# Patient Record
Sex: MALE | Race: ASIAN | Employment: STUDENT | URBAN - METROPOLITAN AREA
[De-identification: names, ages, dates, MRNs, and addresses within clinical notes are randomized per-mention and may not be internally consistent; named-entity substitution may affect disease eponyms.]

---
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
## 2024-03-03 ENCOUNTER — APPOINTMENT (OUTPATIENT)
Dept: CT IMAGING | Facility: HOSPITAL | Age: 19
End: 2024-03-03
Attending: PEDIATRICS
Payer: COMMERCIAL

## 2024-03-03 ENCOUNTER — HOSPITAL ENCOUNTER (EMERGENCY)
Facility: HOSPITAL | Age: 19
Discharge: HOME | End: 2024-03-03
Attending: PEDIATRICS
Payer: COMMERCIAL

## 2024-03-03 VITALS
TEMPERATURE: 99 F | HEART RATE: 94 BPM | SYSTOLIC BLOOD PRESSURE: 138 MMHG | DIASTOLIC BLOOD PRESSURE: 82 MMHG | RESPIRATION RATE: 24 BRPM | OXYGEN SATURATION: 96 %

## 2024-03-03 DIAGNOSIS — D72.819 LEUKOPENIA, UNSPECIFIED TYPE: ICD-10-CM

## 2024-03-03 DIAGNOSIS — D69.6 THROMBOCYTOPENIA (HCC): ICD-10-CM

## 2024-03-03 DIAGNOSIS — R51.9 SINUS HEADACHE: Primary | ICD-10-CM

## 2024-03-03 LAB
ALBUMIN SERPL-MCNC: 4 G/DL (ref 3.4–5)
ALBUMIN/GLOB SERPL: 1 {RATIO} (ref 1–2)
ALP LIVER SERPL-CCNC: 81 U/L
ALT SERPL-CCNC: 53 U/L
ANION GAP SERPL CALC-SCNC: 3 MMOL/L (ref 0–18)
AST SERPL-CCNC: 56 U/L (ref 15–37)
BASOPHILS # BLD AUTO: 0.01 X10(3) UL (ref 0–0.2)
BASOPHILS NFR BLD AUTO: 0.3 %
BILIRUB SERPL-MCNC: 1.1 MG/DL (ref 0.1–2)
BUN BLD-MCNC: 14 MG/DL (ref 9–23)
CALCIUM BLD-MCNC: 9.1 MG/DL (ref 8.5–10.1)
CHLORIDE SERPL-SCNC: 106 MMOL/L (ref 98–112)
CO2 SERPL-SCNC: 27 MMOL/L (ref 21–32)
CREAT BLD-MCNC: 0.92 MG/DL
EGFRCR SERPLBLD CKD-EPI 2021: 122 ML/MIN/1.73M2 (ref 60–?)
EOSINOPHIL # BLD AUTO: 0.03 X10(3) UL (ref 0–0.7)
EOSINOPHIL NFR BLD AUTO: 0.9 %
ERYTHROCYTE [DISTWIDTH] IN BLOOD BY AUTOMATED COUNT: 12.6 %
GLOBULIN PLAS-MCNC: 3.9 G/DL (ref 2.8–4.4)
GLUCOSE BLD-MCNC: 107 MG/DL (ref 70–99)
HCT VFR BLD AUTO: 42.8 %
HGB BLD-MCNC: 14.7 G/DL
IMM GRANULOCYTES # BLD AUTO: 0.01 X10(3) UL (ref 0–1)
IMM GRANULOCYTES NFR BLD: 0.3 %
LYMPHOCYTES # BLD AUTO: 0.49 X10(3) UL (ref 1–4)
LYMPHOCYTES NFR BLD AUTO: 15.4 %
MCH RBC QN AUTO: 28.5 PG (ref 26–34)
MCHC RBC AUTO-ENTMCNC: 34.3 G/DL (ref 31–37)
MCV RBC AUTO: 83.1 FL
MONOCYTES # BLD AUTO: 0.46 X10(3) UL (ref 0.1–1)
MONOCYTES NFR BLD AUTO: 14.4 %
NEUTROPHILS # BLD AUTO: 2.19 X10 (3) UL (ref 1.5–7.7)
NEUTROPHILS # BLD AUTO: 2.19 X10(3) UL (ref 1.5–7.7)
NEUTROPHILS NFR BLD AUTO: 68.7 %
OSMOLALITY SERPL CALC.SUM OF ELEC: 283 MOSM/KG (ref 275–295)
PLATELET # BLD AUTO: 124 10(3)UL (ref 150–450)
POTASSIUM SERPL-SCNC: 4.1 MMOL/L (ref 3.5–5.1)
PROT SERPL-MCNC: 7.9 G/DL (ref 6.4–8.2)
RBC # BLD AUTO: 5.15 X10(6)UL
SODIUM SERPL-SCNC: 136 MMOL/L (ref 136–145)
WBC # BLD AUTO: 3.2 X10(3) UL (ref 4–11)

## 2024-03-03 PROCEDURE — 99284 EMERGENCY DEPT VISIT MOD MDM: CPT

## 2024-03-03 PROCEDURE — 99285 EMERGENCY DEPT VISIT HI MDM: CPT

## 2024-03-03 PROCEDURE — 85025 COMPLETE CBC W/AUTO DIFF WBC: CPT | Performed by: PEDIATRICS

## 2024-03-03 PROCEDURE — 96361 HYDRATE IV INFUSION ADD-ON: CPT

## 2024-03-03 PROCEDURE — 96374 THER/PROPH/DIAG INJ IV PUSH: CPT

## 2024-03-03 PROCEDURE — 80053 COMPREHEN METABOLIC PANEL: CPT | Performed by: PEDIATRICS

## 2024-03-03 PROCEDURE — 70450 CT HEAD/BRAIN W/O DYE: CPT | Performed by: PEDIATRICS

## 2024-03-03 RX ORDER — DESVENLAFAXINE SUCCINATE 50 MG/1
50 TABLET, EXTENDED RELEASE ORAL DAILY
COMMUNITY

## 2024-03-03 RX ORDER — KETOROLAC TROMETHAMINE 30 MG/ML
30 INJECTION, SOLUTION INTRAMUSCULAR; INTRAVENOUS ONCE
Status: COMPLETED | OUTPATIENT
Start: 2024-03-03 | End: 2024-03-03

## 2024-03-04 NOTE — ED INITIAL ASSESSMENT (HPI)
Pt to the emergency room for diffuse headache, pt seen at  prior to arrival. Per DC paperwork pt covid-, sent for concerns of brain bleed no known injury. General aches and pains noted. Fever noted at home 2 weeks ago. No fever today

## 2024-03-04 NOTE — DISCHARGE INSTRUCTIONS
Take Tylenol or ibuprofen as needed for headache.  Follow-up with your primary care doctor or the hematologist in 2 to 3 weeks to have your labs redrawn.  Seek immediate medical care if you have severely worsening headache, lots of vomiting, numbness, weakness, neck stiffness, unexplained bleeding or any other major concerns.

## 2024-03-04 NOTE — ED PROVIDER NOTES
Patient Seen in: [facility] Emergency Department      History     Chief Complaint   Patient presents with    Headache     Stated Complaint: HA    Subjective:   18-year-old healthy with history of seasonal allergies male referred from immediate care due to concern for headache.  Patient states that he developed nontraumatic bifrontal headache exacerbated with eye movements, worse at night for the last 2 nights.  Patient denies any dizziness, photophobia, blurry vision, neck stiffness, focal numbness/weakness, nausea, vomiting or concurrent URI symptoms.  Patient also denies any fevers.  Patient states he has a history of migraines however this does not feel like a migraine headache.            Objective:   Past Medical History:   Diagnosis Date    Anxiety     Attention deficit hyperactivity disorder (ADHD) evaluation     Disorder of eye, unspecified 2010    OU Increased C/D ratio    Glaucoma suspect of both eyes 6/22/2015    Headache disorder     Hyperopia of both eyes with astigmatism 2007    OU    Migraine     Myopia of both eyes 6/22/2015    Pseudostrabismus 2007    OU              History reviewed. No pertinent surgical history.             Social History     Socioeconomic History    Marital status: Single   Tobacco Use    Smoking status: Never    Smokeless tobacco: Never   Vaping Use    Vaping Use: Never used   Substance and Sexual Activity    Alcohol use: Never    Drug use: Never    Sexual activity: Never   Social History Narrative    ** Merged History Encounter **                   Review of Systems   Constitutional:  Negative for fever.   HENT:  Negative for congestion.    Eyes:  Negative for photophobia, pain and visual disturbance.   Respiratory:  Negative for cough.    Gastrointestinal:  Negative for nausea and vomiting.   Musculoskeletal:  Negative for neck pain and neck stiffness.   Skin:  Negative for rash.   Allergic/Immunologic: Negative for immunocompromised state.   Neurological:  Positive  for headaches. Negative for dizziness.       Positive for stated complaint: HA  Other systems are as noted in HPI.  Constitutional and vital signs reviewed.      All other systems reviewed and negative except as noted above.    Physical Exam     ED Triage Vitals [03/03/24 1941]   /82   Pulse 94   Resp 24   Temp 98.5 °F (36.9 °C)   Temp src Oral   SpO2 96 %   O2 Device        Current:/82   Pulse 94   Temp 98.5 °F (36.9 °C) (Oral)   Resp 24   SpO2 96%         Physical Exam  Vitals and nursing note reviewed.   Constitutional:       General: He is not in acute distress.     Appearance: Normal appearance. He is not ill-appearing, toxic-appearing or diaphoretic.      Comments: Well-appearing, afebrile and in no apparent distress   HENT:      Head: Normocephalic and atraumatic.      Comments: No facial asymmetry/droop, crepitus or tenderness noted     Right Ear: Tympanic membrane normal.      Left Ear: Tympanic membrane normal.      Nose: Nose normal.      Mouth/Throat:      Mouth: Mucous membranes are moist.      Pharynx: Oropharynx is clear.   Eyes:      Extraocular Movements: Extraocular movements intact.      Conjunctiva/sclera: Conjunctivae normal.      Pupils: Pupils are equal, round, and reactive to light.   Cardiovascular:      Rate and Rhythm: Normal rate and regular rhythm.      Pulses: Normal pulses.      Heart sounds: Normal heart sounds.   Pulmonary:      Effort: Pulmonary effort is normal.      Breath sounds: Normal breath sounds.   Musculoskeletal:         General: Normal range of motion.      Cervical back: Normal range of motion and neck supple. No rigidity or tenderness.   Skin:     General: Skin is warm.      Capillary Refill: Capillary refill takes less than 2 seconds.   Neurological:      General: No focal deficit present.      Mental Status: He is alert and oriented to person, place, and time.      GCS: GCS eye subscore is 4. GCS verbal subscore is 5. GCS motor subscore is 6.       Cranial Nerves: Cranial nerves 2-12 are intact. No cranial nerve deficit or facial asymmetry.      Sensory: Sensation is intact.      Motor: Motor function is intact.             ED Course     Labs Reviewed   COMP METABOLIC PANEL (14) - Abnormal; Notable for the following components:       Result Value    Glucose 107 (*)     AST 56 (*)     All other components within normal limits   CBC W/ DIFFERENTIAL - Abnormal; Notable for the following components:    WBC 3.2 (*)     .0 (*)     Lymphocyte Absolute 0.49 (*)     All other components within normal limits   CBC WITH DIFFERENTIAL WITH PLATELET    Narrative:     The following orders were created for panel order CBC With Differential With Platelet.  Procedure                               Abnormality         Status                     ---------                               -----------         ------                     CBC W/ DIFFERENTIAL[[id number]]          Abnormal            Final result                 Please view results for these tests on the individual orders.   SCAN SLIDE          ED Course as of 03/03/24 2218  ------------------------------------------------------------  Time: 03/03 2108  Comment: WBC 3.2, plts 124 K  ------------------------------------------------------------  Time: 03/03 2142  Comment: Brain CT grossly unremarkable  ------------------------------------------------------------  Time: 03/03 2208  Comment: Will discuss with heme-onc due to abnormal WBC and platelet findings  ------------------------------------------------------------  Time: 03/03 2213  Comment: Discussed with Dr Fiore from Heme/Onc, likely reactive or viral suppression. Recommend repeat in 2-3 weeks.  Patient likely has some sort of sinus pressure headache.  Will discharge home to continue nasal saline/sinus rinses.  Consider nasal Flonase and some oral antihistamine such as Zyrtec.  Instructions when to seek emergent care for worsening symptoms provided.  Follow-up  with PCP and heme-onc.     Assessment & Plan: Well-appearing with headache likely sinus headache, possible tension headache, possible atypical migraine.  Will obtain brain CT and administer IV Toradol and fluid bolus.  Reassess for disposition     Independent historian: mother  Pertinent co-morbidities affecting presentation: None  Differential diagnoses considered: I considered various etiologies / differetial diagosis including but not limited to, tension headache, migraine headache, sinus headache, less likely acute intracranial bleed or mass. The patient was well-appearing and did not show any evidence of serious bacterial infection.  Diagnostic tests considered but not performed: Brain MRI -low concern for acute intracranial bleed or CVA    ED Course:    Prescription drug management considerations: prn Tylenol/Motrin  Consideration regarding hospitalization or escalation of care: None at this time follow-up with people want thanks  Gargle with your make sure there is no emergency you do not  Social determinants of health: None      I have considered other serious etiologies for this patient's complaints, however the presentation is not consistent with such entities. Patient was screened and evaluated during this visit.   As a treating physician attending to the patient, I determined, within reasonable clinical confidence and prior to discharge, that an emergency medical condition was not or was no longer present. Patient or caregiver understands the course of events that occurred in the emergency department. Instructions when to seek emergent medical care was reviewed. Advised parent or caregiver to follow up with primary care physician.        This report has been produced using speech recognition software and may contain errors related to that system including, but not limited to, errors in grammar, punctuation, and spelling, as well as words and phrases that possibly may have been recognized inappropriately.   If there are any questions or concerns, contact the dictating provider for clarification.           MDM    Radiology:  Imaging ordered independently visualized and interpreted by myself (along with review of radiologist's interpretation) and noted the following: brain CT without acute bleed or mass    CT BRAIN OR HEAD (89987)    Result Date: 3/3/2024  PROCEDURE:  CT BRAIN OR HEAD (88709)  COMPARISON:  None.  INDICATIONS:  HA  TECHNIQUE:  Noncontrast CT scanning is performed through the brain. Dose reduction techniques were used. Dose information is transmitted to the ACR (American College of Radiology) NRDR (National Radiology Data Registry) which includes the Dose Index Registry.  PATIENT STATED HISTORY: (As transcribed by Technologist)  Pt to the emergency room for diffuse headache    FINDINGS: No evidence of hemorrhage or extra-axial fluid collection.  Supra and infratentorial brain parenchyma are unremarkable.  Ventricles and sulci are appropriate for the patient's age.  No mass effect.  Visualized portions of paranasal sinuses are unremarkable.  Visualized portions of mastoid air cells are unremarkable.  Visualized portions of orbits are unremarkable.  IMPRESSION: Unremarkable CT head.    LOCATION:  [facility]   Dictated by (CST): Von Hayes MD on 3/03/2024 at 9:28 PM     Finalized by (CST): Von Hayes MD on 3/03/2024 at 9:29 PM        Labs:  ^^ Personally ordered, reviewed, and interpreted all unique tests ordered.  Clinically significant labs noted: WBC 3.4, Plts 124K    Medications administered:  Medications   sodium chloride 0.9 % IV bolus 1,000 mL (1,000 mL Intravenous New Bag 3/3/24 2021)   ketorolac (Toradol) 30 MG/ML injection 30 mg (30 mg Intravenous Given 3/3/24 2021)       Pulse oximetry:  Pulse oximetry on room air is 96% and is normal.     Cardiac monitoring:  Initial heart rate is 94 and is normal for age    Vital signs:  Vitals:    03/03/24 1941   BP: 138/82   Pulse: 94   Resp: 24    Temp: 98.5 °F (36.9 °C)   TempSrc: Oral   SpO2: 96%       Chart review:  ^^ Review of prior external notes from unique sources (non-[facility] ED records): noted in history : Immediate care visit 3/3/24 for headache    Disposition and Plan     Clinical Impression:  1. Sinus headache    2. Thrombocytopenia (HCC)    3. Leukopenia, unspecified type         Disposition:  Discharge  3/3/2024 10:17 pm    Follow-up:  Terrence Bee DO  [address] [id number] [phone]    Schedule an appointment as soon as possible for a visit      Chad Fiore MD  [address]  [address]  [facility] [address] [phone]    Schedule an appointment as soon as possible for a visit      [facility] Emergency Department  [address] [facility] [address] [phone]  Follow up  If symptoms worsen          Medications Prescribed:  Current Discharge Medication List

## (undated) NOTE — MR AVS SNAPSHOT
[place]  [address]  [phone]               Thank you for choosing us for [facility].  We are glad to serve you and happy to provide you with this hassan [email or web address]

## (undated) NOTE — MR AVS SNAPSHOT
[place]  [address]  [phone]               Thank you for choosing us for your health care visit with [facility].  Jalyn Sánchez MD.  We are glad to serve you and happy to provide you with this hassan loratadine 10 MG Tabs   Take 10 mg by mouth daily.    Commonly known as:  CLARITIN           Methylphenidate HCl ER 36 MG Tbcr           PROAIR  (90 Base) MCG/ACT Aers   Generic drug:  Albuterol Sulfate HFA   Inhale  into the lungs every 6 (six) joyce o Be role models themselves by making healthy eating and daily physical activity the norm for their family.   o Create a home where healthy choices are available and encouraged  o Make it fun – find ways to engage your children such as:  o playing a game of